# Patient Record
Sex: FEMALE | Race: WHITE
[De-identification: names, ages, dates, MRNs, and addresses within clinical notes are randomized per-mention and may not be internally consistent; named-entity substitution may affect disease eponyms.]

---

## 2017-06-10 ENCOUNTER — HEALTH MAINTENANCE LETTER (OUTPATIENT)
Age: 52
End: 2017-06-10

## 2018-04-26 ENCOUNTER — THERAPY VISIT (OUTPATIENT)
Dept: PHYSICAL THERAPY | Facility: CLINIC | Age: 53
End: 2018-04-26
Payer: COMMERCIAL

## 2018-04-26 DIAGNOSIS — M25.572 LEFT LATERAL ANKLE PAIN: Primary | ICD-10-CM

## 2018-04-26 DIAGNOSIS — M72.2 PLANTAR FASCIITIS: ICD-10-CM

## 2018-04-26 PROCEDURE — 97110 THERAPEUTIC EXERCISES: CPT | Mod: GP | Performed by: PHYSICAL THERAPIST

## 2018-04-26 PROCEDURE — 97161 PT EVAL LOW COMPLEX 20 MIN: CPT | Mod: GP | Performed by: PHYSICAL THERAPIST

## 2018-04-26 NOTE — PROGRESS NOTES
[facility] Initial Evaluation  Subjective:  Patient is a 52 year old female presenting with rehab left ankle/foot hpi. The history is provided by the patient.   Briseyda Brown is a 52 year old female with a left ankle condition.  Condition occurred with:  Repetition/overuse (hour a day walking/ onlin  would 2-4 hours a day ).  Condition occurred: at work and in the community.  This is a chronic condition  January 2018 on/off    .    Patient reports pain:  Lateral (heel pain ).  Radiates to:  No radiation.  Pain is described as aching and is intermittent and reported as 3/10 (at worst 8/10 after a lot of activity ).  Associated symptoms:  Loss of motion/stiffness and edema. Pain is worse in the A.M..  Symptoms are exacerbated by walking, activity and descending stairs and relieved by NSAID's.  Since onset symptoms are gradually worsening.  Special tests:  MRI.  Previous treatment includes chiropractic (none).  There was no improvement following previous treatment.  General health as reported by patient is good.  Pertinent medical history includes:  Overweight and osteoarthritis (osteoarthritis ).  Medical allergies: no.  Surgical history: knee x4, left ankle x4, toe surgery x1 all on L LE , Total knee on L in the future.  Current medications:  Anti-depressants.  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  Patient is working in normal job without restrictions.  Primary job tasks include:  Prolonged sitting and prolonged standing (computer work ).    Barriers include:  None as reported by patient.    Red flags:  None as reported by patient.                        Objective:    Gait:  Decreased stance time on L LE due to pain   Gait Type:  Antalgic               Ankle/Foot Evaluation  ROM:    AROM:    Dorsiflexion:  Left:   5  Right:   14  Plantarflexion:  Left:  56    Right:  56  Inversion:  Left:  30     Right:  30  Eversion:  12     Right:  12      PROM:    Dorsiflexion: Left:     15     Right:                   Strength wnl ankle: fearful to raise up on toes   LIGAMENT TESTING: not assessed              SPECIAL TESTS: not assessed    PALPATION: Palpation of ankle: tender plantar aspect of the calcaneous on the L with a deeper pressure.    EDEMA: normal            FUNCTIONAL TESTS: Functional test ankle: low back pain with EIS and R SGIS felt in the lower back on the L; NE on the ankle.  After repeated pressups, then increased strength with calf raise and 11 deg DF afterwards.                                                              General     ROS    Assessment/Plan:    Patient is a 52 year old female with left side ankle complaints.    Patient has the following significant findings with corresponding treatment plan.                Diagnosis 1:  L ankle/heel pain    Pain -  manual therapy, self management, education, directional preference exercise and home program  Decreased ROM/flexibility - manual therapy, therapeutic exercise, therapeutic activity and home program  Decreased joint mobility - manual therapy, therapeutic exercise, therapeutic activity and home program  Decreased strength - therapeutic exercise, therapeutic activities and home program  Impaired balance - neuro re-education, therapeutic activities and home program  Impaired gait - home program  Decreased function - therapeutic activities and home program    Therapy Evaluation Codes:   1) History comprised of:   Personal factors that impact the plan of care:      None.    Comorbidity factors that impact the plan of care are:      None.     Medications impacting care: None.  2) Examination of Body Systems comprised of:   Body structures and functions that impact the plan of care:      Ankle, Knee and Lumbar spine.   Activity limitations that impact the plan of care are:      Stairs, Walking and Working.  3) Clinical presentation characteristics are:   Stable/Uncomplicated.  4) Decision-Making    Low complexity using  standardized patient assessment instrument and/or measureable assessment of functional outcome.  Cumulative Therapy Evaluation is: Low complexity.    Previous and current functional limitations:  (See Goal Flow Sheet for this information)    Short term and Long term goals: (See Goal Flow Sheet for this information)     Communication ability:  Patient appears to be able to clearly communicate and understand verbal and written communication and follow directions correctly.  Treatment Explanation - The following has been discussed with the patient:   RX ordered/plan of care  Anticipated outcomes  Possible risks and side effects  This patient would benefit from PT intervention to resume normal activities.   Rehab potential is good.    Frequency:  1 X week, once daily  Duration:  for 8 weeks  Discharge Plan:  Achieve all LTG.  Independent in home treatment program.  Reach maximal therapeutic benefit.    Please refer to the daily flowsheet for treatment today, total treatment time and time spent performing 1:1 timed codes.

## 2018-04-26 NOTE — MR AVS SNAPSHOT
"              After Visit Summary   4/26/2018    Briseyda Brown    MRN: [id number]           Patient Information     Date Of Birth          1965        Visit Information        Provider Department      4/26/2018 7:00 AM Gissel Irwin, PT [facility]        Today's Diagnoses     Left lateral ankle pain    -  1    Plantar fasciitis           Follow-ups after your visit        Your next 10 appointments already scheduled     May 03, 2018  7:00 AM CDT   SAMIR Extremity with Devora Watson, PT   [facility] ([facility]  )    [address]  [facility] [id number] [phone]            May 10, 2018  7:00 AM CDT   SAMIR Extremity with Devora Watson, PT   [facility] ([facility]  )    [address]  [facility] [id number] [phone]              Who to contact     If you have questions or need follow up information about today's clinic visit or your schedule please contact [facility] directly at [phone].  Normal or non-critical lab and imaging results will be communicated to you by MyChart, letter or phone within 4 business days after the clinic has received the results. If you do not hear from us within 7 days, please contact the clinic through [employer] or phone. If you have a critical or abnormal lab result, we will notify you by phone as soon as possible.  Submit refill requests through [employer] or call your pharmacy and they will forward the refill request to us. Please allow 3 business days for your refill to be completed.          Additional Information About Your Visit        MyChart Information     [employer] lets you send messages to your doctor, view your test results, renew your prescriptions, schedule appointments and more. To sign up, go to [email or web address] . Click on \"Log in\" on the left side of the screen, " "which will take you to the Welcome page. Then click on \"Sign up Now\" on the right side of the page.     You will be asked to enter the access code listed below, as well as some personal information. Please follow the directions to create your username and password.     Your access code is: [email or web address]  Expires: 2018  9:56 AM     Your access code will  in 90 days. If you need help or a new code, please call your [facility] clinic or [phone].        Care EveryWhere ID     This is your Care EveryWhere ID. This could be used by other organizations to access your [facility] medical records  [id number]         Blood Pressure from Last 3 Encounters:   11 118/71   11 107/61    Weight from Last 3 Encounters:   11 72.6 kg (160 lb)   11 72.6 kg (160 lb)              We Performed the Following     HC PT EVAL, LOW COMPLEXITY     SAMIR INITIAL EVAL REPORT     THERAPEUTIC EXERCISES        Primary Care Provider    Vic Anaya [facility]        Equal Access to Services     [facility]: Hadii aad ku hadasho Soomaali, waaxda luqadaha, qaybta kaalmada adeegyada, waxay idiin haysilverio dennis . So [facility] [phone].    ATENCIÓN: Si habla español, tiene a bowers disposición servicios gratuitos de asistencia lingüística. Llame al [phone].    We comply with applicable federal civil rights laws and [place] laws. We do not discriminate on the basis of race, color, national origin, age, disability, sex, sexual orientation, or gender identity.            Thank you!     Thank you for choosing [facility]  for your care. Our goal is always to provide you with excellent care. Hearing back from our patients is one way we can continue to improve our services. Please take a few minutes to complete the written survey that you may receive in the mail after your visit with us. Thank you!             Your Updated Medication List - Protect others around you: " Learn how to safely use, store and throw away your medicines at www.disposemymeds.org.      Notice  As of 4/26/2018  9:56 AM    You have not been prescribed any medications.

## 2018-04-26 NOTE — LETTER
[facility]  [address]  [facility] [address]  [phone]    2018    Re: Briseyda Brown   :   1965  MRN:  [id number]   REFERRING PHYSICIAN:   Raciel Fernandes    [facility]  Date of Initial Evaluation: 18  Visits:  Rxs Used: 1  Reason for Referral:     Left lateral ankle pain  Plantar fasciitis    EVALUATION SUMMARY    [facility] Initial Evaluation    Subjective:    Patient is a 52 year old female presenting with rehab left ankle/foot hpi. The history is provided by the patient.     Briseyda Brown is a 52 year old female with a left ankle condition.  Condition occurred with:  Repetition/overuse (hour a day walking/ onlin  would 2-4 hours a day ). Condition occurred: at work and in the community.  This is a chronic condition  2018 on/off.      Patient reports pain:  Lateral (heel pain ).  Radiates to:  No radiation.  Pain is described as aching and is intermittent and reported as 3/10 (at worst 8/10 after a lot of activity ).  Associated symptoms:  Loss of motion/stiffness and edema. Pain is worse in the A.M..  Symptoms are exacerbated by walking, activity and descending stairs and relieved by NSAID's.  Since onset symptoms are gradually worsening.  Special tests:  MRI.  Previous treatment includes chiropractic (none).  There was no improvement following previous treatment.      General health as reported by patient is good.      Pertinent medical history includes:  Overweight and osteoarthritis (osteoarthritis ).  Medical allergies: no.  Surgical history: knee x4, left ankle x4, toe surgery x1 all on L LE, Total knee on L in the future. Current medications: Anti-depressants. 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  Primary job tasks include: Prolonged sitting and prolonged standing (computer work  ).    Barriers include:  None as reported by patient.    Red flags:  None as reported by patient.                  Objective:  Gait:  Decreased stance time on L LE due to pain   Gait Type:  Antalgic       Ankle/Foot Evaluation  ROM:    AROM:    Dorsiflexion:  Left:   5  Right:   14  Plantarflexion:  Left:  56    Right:  56  Inversion:  Left:  30     Right:  30  Eversion:  12     Right:  12    PROM:    Dorsiflexion: Left:    15     Right:     Strength wnl ankle: fearful to raise up on toes     LIGAMENT TESTING: not assessed    SPECIAL TESTS: not assessed    PALPATION: Palpation of ankle: tender plantar aspect of the calcaneous on the L with a deeper pressure.    EDEMA: normal    FUNCTIONAL TESTS: Functional test ankle: low back pain with EIS and R SGIS felt in the lower back on the L; NE on the ankle.  After repeated pressups, then increased strength with calf raise and 11 deg DF afterwards.    Assessment/Plan:    Patient is a 52 year old female with left side ankle complaints.    Patient has the following significant findings with corresponding treatment plan.                  Diagnosis 1:  L ankle/heel pain    Pain -  manual therapy, self management, education, directional preference exercise and home program  Decreased ROM/flexibility - manual therapy, therapeutic exercise, therapeutic activity and home program  Decreased joint mobility - manual therapy, therapeutic exercise, therapeutic activity and home program  Decreased strength - therapeutic exercise, therapeutic activities and home program  Impaired balance - neuro re-education, therapeutic activities and home program  Impaired gait - home program  Decreased function - therapeutic activities and home program    Therapy Evaluation Codes:   1) History comprised of:   Personal factors that impact the plan of care:      None.    Comorbidity factors that impact the plan of care are:      None.     Medications impacting care: None.    2) Examination of Body  Systems comprised of:   Body structures and functions that impact the plan of care:      Ankle, Knee and Lumbar spine.   Activity limitations that impact the plan of care are:      Stairs, Walking and Working.  3) Clinical presentation characteristics are:   Stable/Uncomplicated.  4) Decision-Making    Low complexity using standardized patient assessment instrument and/or measureable assessment of functional outcome.  Cumulative Therapy Evaluation is: Low complexity.    Previous and current functional limitations:  (See Goal Flow Sheet for this information)    Short term and Long term goals: (See Goal Flow Sheet for this information)     Communication ability:  Patient appears to be able to clearly communicate and understand verbal and written communication and follow directions correctly.  Treatment Explanation - The following has been discussed with the patient:   RX ordered/plan of care  Anticipated outcomes  Possible risks and side effects  This patient would benefit from PT intervention to resume normal activities.   Rehab potential is good.    Frequency:  1 X week, once daily  Duration:  for 8 weeks  Discharge Plan:  Achieve all LTG.  Independent in home treatment program.  Reach maximal therapeutic benefit.    Thank you for your referral.    INQUIRIES  Therapist: Gissel Irwin, PT, Dip. MDT, FAAOMPT  [facility]  [address]  [facility] [address]  Phone: [phone]  Fax: [phone]

## 2018-05-03 ENCOUNTER — THERAPY VISIT (OUTPATIENT)
Dept: PHYSICAL THERAPY | Facility: CLINIC | Age: 53
End: 2018-05-03
Payer: COMMERCIAL

## 2018-05-03 DIAGNOSIS — M72.2 PLANTAR FASCIITIS: ICD-10-CM

## 2018-05-03 DIAGNOSIS — M25.572 LEFT LATERAL ANKLE PAIN: ICD-10-CM

## 2018-05-03 PROCEDURE — 97110 THERAPEUTIC EXERCISES: CPT | Mod: GP | Performed by: PHYSICAL THERAPIST

## 2018-05-03 PROCEDURE — 97140 MANUAL THERAPY 1/> REGIONS: CPT | Mod: GP | Performed by: PHYSICAL THERAPIST

## 2018-05-10 ENCOUNTER — THERAPY VISIT (OUTPATIENT)
Dept: PHYSICAL THERAPY | Facility: CLINIC | Age: 53
End: 2018-05-10
Payer: COMMERCIAL

## 2018-05-10 DIAGNOSIS — M25.572 LEFT LATERAL ANKLE PAIN: ICD-10-CM

## 2018-05-10 DIAGNOSIS — M72.2 PLANTAR FASCIITIS: ICD-10-CM

## 2018-05-10 PROCEDURE — 97140 MANUAL THERAPY 1/> REGIONS: CPT | Mod: GP | Performed by: PHYSICAL THERAPIST

## 2018-05-10 PROCEDURE — 97110 THERAPEUTIC EXERCISES: CPT | Mod: GP | Performed by: PHYSICAL THERAPIST
